# Patient Record
Sex: FEMALE | URBAN - NONMETROPOLITAN AREA
[De-identification: names, ages, dates, MRNs, and addresses within clinical notes are randomized per-mention and may not be internally consistent; named-entity substitution may affect disease eponyms.]

---

## 2024-04-17 ENCOUNTER — TELEPHONE (OUTPATIENT)
Dept: SURGERY | Age: 54
End: 2024-04-17

## 2024-05-06 ENCOUNTER — HOSPITAL ENCOUNTER (OUTPATIENT)
Dept: ULTRASOUND IMAGING | Age: 54
Discharge: HOME | End: 2024-05-06
Attending: RADIOLOGY

## 2024-05-06 DIAGNOSIS — Z00.6 ENCOUNTER FOR EXAMINATION FOR NORMAL COMPARISON OR CONTROL IN CLINICAL RESEARCH PROGRAM: ICD-10-CM

## 2024-05-08 ENCOUNTER — OFFICE VISIT (OUTPATIENT)
Dept: SURGERY | Age: 54
End: 2024-05-08
Payer: MEDICAID

## 2024-05-08 VITALS
HEART RATE: 92 BPM | DIASTOLIC BLOOD PRESSURE: 68 MMHG | OXYGEN SATURATION: 98 % | RESPIRATION RATE: 16 BRPM | SYSTOLIC BLOOD PRESSURE: 132 MMHG | BODY MASS INDEX: 39.05 KG/M2 | TEMPERATURE: 97.8 F | HEIGHT: 63 IN | WEIGHT: 220.4 LBS

## 2024-05-08 DIAGNOSIS — Z01.818 PRE-OP TESTING: ICD-10-CM

## 2024-05-08 DIAGNOSIS — K80.20 CALCULUS OF GALLBLADDER WITHOUT CHOLECYSTITIS WITHOUT OBSTRUCTION: Primary | ICD-10-CM

## 2024-05-08 LAB
ALBUMIN: 4.4 G/DL (ref 3.5–5)
ALP BLD-CCNC: 94 IU/L (ref 39–118)
ALT SERPL-CCNC: 18 IU/L (ref 10–40)
AST SERPL-CCNC: 17 IU/L (ref 15–41)
BILIRUB SERPL-MCNC: 0.3 MG/DL (ref 0.2–1)
BILIRUBIN DIRECT: 0.1 MG/DL (ref 0.1–0.2)
TOTAL PROTEIN: 6.9 G/DL (ref 6.2–8)

## 2024-05-08 PROCEDURE — 99204 OFFICE O/P NEW MOD 45 MIN: CPT | Performed by: SURGERY

## 2024-05-09 ASSESSMENT — ENCOUNTER SYMPTOMS
EYE ITCHING: 0
CONSTIPATION: 0
DIARRHEA: 1
VOICE CHANGE: 0
NAUSEA: 1
SHORTNESS OF BREATH: 0
EYE REDNESS: 0
RHINORRHEA: 0
EYE DISCHARGE: 0
APNEA: 0
RECTAL PAIN: 0
BLOOD IN STOOL: 0
EYE PAIN: 0
SINUS PRESSURE: 0
ABDOMINAL DISTENTION: 1
WHEEZING: 0
COLOR CHANGE: 0
TROUBLE SWALLOWING: 0
ALLERGIC/IMMUNOLOGIC NEGATIVE: 1
ABDOMINAL PAIN: 1
PHOTOPHOBIA: 0
CHEST TIGHTNESS: 0
FACIAL SWELLING: 0
SORE THROAT: 0
COUGH: 0
BACK PAIN: 0
VOMITING: 1
CHOKING: 0
ANAL BLEEDING: 0
STRIDOR: 0

## 2024-05-17 ENCOUNTER — PREP FOR PROCEDURE (OUTPATIENT)
Dept: SURGERY | Age: 54
End: 2024-05-17

## 2024-05-17 RX ORDER — SODIUM CHLORIDE 0.9 % (FLUSH) 0.9 %
5-40 SYRINGE (ML) INJECTION PRN
Status: CANCELLED | OUTPATIENT
Start: 2024-05-17

## 2024-05-17 RX ORDER — SODIUM CHLORIDE 9 MG/ML
INJECTION, SOLUTION INTRAVENOUS CONTINUOUS
Status: CANCELLED | OUTPATIENT
Start: 2024-05-17

## 2024-05-17 RX ORDER — SODIUM CHLORIDE 0.9 % (FLUSH) 0.9 %
5-40 SYRINGE (ML) INJECTION EVERY 12 HOURS SCHEDULED
Status: CANCELLED | OUTPATIENT
Start: 2024-05-17

## 2024-05-17 RX ORDER — INDOCYANINE GREEN AND WATER 25 MG
2.5 KIT INJECTION ONCE
Status: CANCELLED | OUTPATIENT
Start: 2024-05-17 | End: 2024-05-17

## 2024-05-17 RX ORDER — SODIUM CHLORIDE 9 MG/ML
INJECTION, SOLUTION INTRAVENOUS PRN
Status: CANCELLED | OUTPATIENT
Start: 2024-05-17

## 2024-05-26 NOTE — H&P
Barbara Olivares (:  1970)      ASSESSMENT:  1.  Biliary dyskinesia  2.  Cholelithiasis  3.  Obesity (BMI 39)     PLAN:  1. Schedule Barbara for robotic possible open cholecystectomy.  2. She will undergo pre-operative clearance per anesthesia guidelines with risk factors listed under the past medical history diagnosis & problem list.  3. The risks, benefits and alternatives were discussed with Barbara including non-operative management.  The pros and cons of robotic, laparoscopic and open techniques were discussed.  All questions answered. She understands and wishes to proceed with surgical intervention.  4. Restrictions discussed with Barbara and she expresses understanding.  5. She is advised to call back directly if there are further questions/concerns, or if her symptoms worsen prior to surgery.  6.  Dietary modification/restrictions discussed with patient in regards to biliary disease     SUBJECTIVE/OBJECTIVE:          Chief Complaint   Patient presents with    Surgical Consult       NP ref by Ashly Hernandez CNP - Cholelithiasis      HPI  Barbara is a 53-year-old female who presents for initial evaluation secondary to gallbladder disease.  She states she has been having attacks now for about 3 months.  Epigastric and right upper quadrant abdominal pain.  Worse after eating.  Especially worse after large heavy fatty meals.  Associated with some nausea.  Rare emesis.  Dark tea colored urine.  Light stools.  Pain can become severe.  Sharp.  Attacks last several minutes to several hours.  They seem to be coming more frequent and are more severe.  No generalized abdominal pain.  No chest pain or shortness of breath.  No hematochezia or melena.  History of .  Gallbladder ultrasound demonstrated significant cholelithiasis.  HIDA scan had ejection fraction of only 14%.     Review of Systems   Constitutional:  Positive for appetite change. Negative for activity change, chills, diaphoresis, fatigue, fever

## 2024-05-28 ENCOUNTER — HOSPITAL ENCOUNTER (OUTPATIENT)
Age: 54
Setting detail: OUTPATIENT SURGERY
Discharge: HOME | End: 2024-05-28
Attending: SURGERY | Admitting: SURGERY
Payer: MEDICAID

## 2024-05-28 ENCOUNTER — ANESTHESIA EVENT (OUTPATIENT)
Dept: OPERATING ROOM | Age: 54
End: 2024-05-28
Payer: MEDICAID

## 2024-05-28 ENCOUNTER — ANESTHESIA (OUTPATIENT)
Dept: OPERATING ROOM | Age: 54
End: 2024-05-28
Payer: MEDICAID

## 2024-05-28 VITALS
HEART RATE: 99 BPM | RESPIRATION RATE: 18 BRPM | WEIGHT: 216.4 LBS | OXYGEN SATURATION: 99 % | DIASTOLIC BLOOD PRESSURE: 68 MMHG | TEMPERATURE: 97 F | HEIGHT: 63 IN | SYSTOLIC BLOOD PRESSURE: 129 MMHG | BODY MASS INDEX: 38.34 KG/M2

## 2024-05-28 DIAGNOSIS — K80.10 CALCULUS OF GALLBLADDER WITH CHOLECYSTITIS WITHOUT BILIARY OBSTRUCTION, UNSPECIFIED CHOLECYSTITIS ACUITY: ICD-10-CM

## 2024-05-28 PROCEDURE — 3700000000 HC ANESTHESIA ATTENDED CARE: Performed by: SURGERY

## 2024-05-28 PROCEDURE — C1889 IMPLANT/INSERT DEVICE, NOC: HCPCS | Performed by: SURGERY

## 2024-05-28 PROCEDURE — 3600000019 HC SURGERY ROBOT ADDTL 15MIN: Performed by: SURGERY

## 2024-05-28 PROCEDURE — 2580000003 HC RX 258: Performed by: NURSE PRACTITIONER

## 2024-05-28 PROCEDURE — 3600000009 HC SURGERY ROBOT BASE: Performed by: SURGERY

## 2024-05-28 PROCEDURE — 6370000000 HC RX 637 (ALT 250 FOR IP)

## 2024-05-28 PROCEDURE — 2500000003 HC RX 250 WO HCPCS: Performed by: NURSE ANESTHETIST, CERTIFIED REGISTERED

## 2024-05-28 PROCEDURE — 7100000011 HC PHASE II RECOVERY - ADDTL 15 MIN: Performed by: SURGERY

## 2024-05-28 PROCEDURE — 2500000003 HC RX 250 WO HCPCS: Performed by: NURSE PRACTITIONER

## 2024-05-28 PROCEDURE — 47562 LAPAROSCOPIC CHOLECYSTECTOMY: CPT | Performed by: SURGERY

## 2024-05-28 PROCEDURE — 2709999900 HC NON-CHARGEABLE SUPPLY: Performed by: SURGERY

## 2024-05-28 PROCEDURE — 6360000002 HC RX W HCPCS: Performed by: SURGERY

## 2024-05-28 PROCEDURE — 6360000002 HC RX W HCPCS: Performed by: NURSE PRACTITIONER

## 2024-05-28 PROCEDURE — 7100000000 HC PACU RECOVERY - FIRST 15 MIN: Performed by: SURGERY

## 2024-05-28 PROCEDURE — 6370000000 HC RX 637 (ALT 250 FOR IP): Performed by: SURGERY

## 2024-05-28 PROCEDURE — 6360000002 HC RX W HCPCS: Performed by: NURSE ANESTHETIST, CERTIFIED REGISTERED

## 2024-05-28 PROCEDURE — 7100000010 HC PHASE II RECOVERY - FIRST 15 MIN: Performed by: SURGERY

## 2024-05-28 PROCEDURE — S2900 ROBOTIC SURGICAL SYSTEM: HCPCS | Performed by: SURGERY

## 2024-05-28 PROCEDURE — 3700000001 HC ADD 15 MINUTES (ANESTHESIA): Performed by: SURGERY

## 2024-05-28 PROCEDURE — 7100000001 HC PACU RECOVERY - ADDTL 15 MIN: Performed by: SURGERY

## 2024-05-28 PROCEDURE — 88304 TISSUE EXAM BY PATHOLOGIST: CPT

## 2024-05-28 DEVICE — CLIP INT L POLYMER LOK LIG HEM O LOK (6EA/PK): Type: IMPLANTABLE DEVICE | Status: FUNCTIONAL

## 2024-05-28 RX ORDER — FENTANYL CITRATE 50 UG/ML
INJECTION, SOLUTION INTRAMUSCULAR; INTRAVENOUS PRN
Status: DISCONTINUED | OUTPATIENT
Start: 2024-05-28 | End: 2024-05-28 | Stop reason: SDUPTHER

## 2024-05-28 RX ORDER — ROCURONIUM BROMIDE 10 MG/ML
INJECTION, SOLUTION INTRAVENOUS PRN
Status: DISCONTINUED | OUTPATIENT
Start: 2024-05-28 | End: 2024-05-28 | Stop reason: SDUPTHER

## 2024-05-28 RX ORDER — MORPHINE SULFATE 4 MG/ML
4 INJECTION, SOLUTION INTRAMUSCULAR; INTRAVENOUS
Status: DISCONTINUED | OUTPATIENT
Start: 2024-05-28 | End: 2024-05-28 | Stop reason: HOSPADM

## 2024-05-28 RX ORDER — OXYCODONE HYDROCHLORIDE 5 MG/1
5 TABLET ORAL EVERY 4 HOURS PRN
Status: DISCONTINUED | OUTPATIENT
Start: 2024-05-28 | End: 2024-05-28 | Stop reason: HOSPADM

## 2024-05-28 RX ORDER — FAMOTIDINE 20 MG/1
20 TABLET, FILM COATED ORAL 2 TIMES DAILY
Status: CANCELLED | OUTPATIENT
Start: 2024-05-28

## 2024-05-28 RX ORDER — SODIUM CHLORIDE 0.9 % (FLUSH) 0.9 %
5-40 SYRINGE (ML) INJECTION EVERY 12 HOURS SCHEDULED
Status: DISCONTINUED | OUTPATIENT
Start: 2024-05-28 | End: 2024-05-28 | Stop reason: HOSPADM

## 2024-05-28 RX ORDER — HYDROCODONE BITARTRATE AND ACETAMINOPHEN 5; 325 MG/1; MG/1
1-2 TABLET ORAL EVERY 6 HOURS PRN
Qty: 20 TABLET | Refills: 0 | Status: SHIPPED | OUTPATIENT
Start: 2024-05-28 | End: 2024-06-03

## 2024-05-28 RX ORDER — FENTANYL CITRATE 50 UG/ML
50 INJECTION, SOLUTION INTRAMUSCULAR; INTRAVENOUS EVERY 5 MIN PRN
Status: DISCONTINUED | OUTPATIENT
Start: 2024-05-28 | End: 2024-05-28 | Stop reason: HOSPADM

## 2024-05-28 RX ORDER — SODIUM CHLORIDE 0.9 % (FLUSH) 0.9 %
5-40 SYRINGE (ML) INJECTION PRN
Status: DISCONTINUED | OUTPATIENT
Start: 2024-05-28 | End: 2024-05-28 | Stop reason: HOSPADM

## 2024-05-28 RX ORDER — SODIUM CHLORIDE 9 MG/ML
INJECTION, SOLUTION INTRAVENOUS PRN
Status: DISCONTINUED | OUTPATIENT
Start: 2024-05-28 | End: 2024-05-28 | Stop reason: HOSPADM

## 2024-05-28 RX ORDER — KETOROLAC TROMETHAMINE 30 MG/ML
INJECTION, SOLUTION INTRAMUSCULAR; INTRAVENOUS PRN
Status: DISCONTINUED | OUTPATIENT
Start: 2024-05-28 | End: 2024-05-28 | Stop reason: SDUPTHER

## 2024-05-28 RX ORDER — MORPHINE SULFATE 2 MG/ML
2 INJECTION, SOLUTION INTRAMUSCULAR; INTRAVENOUS
Status: DISCONTINUED | OUTPATIENT
Start: 2024-05-28 | End: 2024-05-28 | Stop reason: HOSPADM

## 2024-05-28 RX ORDER — INDOCYANINE GREEN AND WATER 25 MG
2.5 KIT INJECTION ONCE
Status: COMPLETED | OUTPATIENT
Start: 2024-05-28 | End: 2024-05-28

## 2024-05-28 RX ORDER — SODIUM CHLORIDE 9 MG/ML
INJECTION, SOLUTION INTRAVENOUS PRN
Status: CANCELLED | OUTPATIENT
Start: 2024-05-28

## 2024-05-28 RX ORDER — OXYCODONE HYDROCHLORIDE 5 MG/1
10 TABLET ORAL EVERY 4 HOURS PRN
Status: DISCONTINUED | OUTPATIENT
Start: 2024-05-28 | End: 2024-05-28 | Stop reason: HOSPADM

## 2024-05-28 RX ORDER — SODIUM CHLORIDE 0.9 % (FLUSH) 0.9 %
5-40 SYRINGE (ML) INJECTION PRN
Status: CANCELLED | OUTPATIENT
Start: 2024-05-28

## 2024-05-28 RX ORDER — ONDANSETRON 4 MG/1
4 TABLET, ORALLY DISINTEGRATING ORAL EVERY 8 HOURS PRN
Status: CANCELLED | OUTPATIENT
Start: 2024-05-28

## 2024-05-28 RX ORDER — BUPIVACAINE HYDROCHLORIDE 5 MG/ML
INJECTION, SOLUTION EPIDURAL; INTRACAUDAL PRN
Status: DISCONTINUED | OUTPATIENT
Start: 2024-05-28 | End: 2024-05-28 | Stop reason: ALTCHOICE

## 2024-05-28 RX ORDER — MEPERIDINE HYDROCHLORIDE 25 MG/ML
12.5 INJECTION INTRAMUSCULAR; INTRAVENOUS; SUBCUTANEOUS EVERY 5 MIN PRN
Status: DISCONTINUED | OUTPATIENT
Start: 2024-05-28 | End: 2024-05-28 | Stop reason: HOSPADM

## 2024-05-28 RX ORDER — ONDANSETRON 2 MG/ML
4 INJECTION INTRAMUSCULAR; INTRAVENOUS EVERY 6 HOURS PRN
Status: CANCELLED | OUTPATIENT
Start: 2024-05-28

## 2024-05-28 RX ORDER — MIDAZOLAM HYDROCHLORIDE 1 MG/ML
INJECTION INTRAMUSCULAR; INTRAVENOUS PRN
Status: DISCONTINUED | OUTPATIENT
Start: 2024-05-28 | End: 2024-05-28 | Stop reason: SDUPTHER

## 2024-05-28 RX ORDER — SODIUM CHLORIDE 9 MG/ML
INJECTION, SOLUTION INTRAVENOUS CONTINUOUS
Status: DISCONTINUED | OUTPATIENT
Start: 2024-05-28 | End: 2024-05-28 | Stop reason: HOSPADM

## 2024-05-28 RX ORDER — SODIUM CHLORIDE 0.9 % (FLUSH) 0.9 %
5-40 SYRINGE (ML) INJECTION EVERY 12 HOURS SCHEDULED
Status: CANCELLED | OUTPATIENT
Start: 2024-05-28

## 2024-05-28 RX ORDER — IPRATROPIUM BROMIDE AND ALBUTEROL SULFATE 2.5; .5 MG/3ML; MG/3ML
1 SOLUTION RESPIRATORY (INHALATION) ONCE
Status: COMPLETED | OUTPATIENT
Start: 2024-05-28 | End: 2024-05-28

## 2024-05-28 RX ORDER — IPRATROPIUM BROMIDE AND ALBUTEROL SULFATE 2.5; .5 MG/3ML; MG/3ML
SOLUTION RESPIRATORY (INHALATION)
Status: COMPLETED
Start: 2024-05-28 | End: 2024-05-28

## 2024-05-28 RX ORDER — FENTANYL CITRATE 50 UG/ML
25 INJECTION, SOLUTION INTRAMUSCULAR; INTRAVENOUS EVERY 5 MIN PRN
Status: DISCONTINUED | OUTPATIENT
Start: 2024-05-28 | End: 2024-05-28 | Stop reason: HOSPADM

## 2024-05-28 RX ORDER — KETOROLAC TROMETHAMINE 10 MG/1
10 TABLET, FILM COATED ORAL EVERY 8 HOURS PRN
Qty: 15 TABLET | Refills: 0 | Status: SHIPPED | OUTPATIENT
Start: 2024-05-28

## 2024-05-28 RX ORDER — ACETAMINOPHEN 325 MG/1
650 TABLET ORAL EVERY 4 HOURS PRN
Status: DISCONTINUED | OUTPATIENT
Start: 2024-05-28 | End: 2024-05-28 | Stop reason: HOSPADM

## 2024-05-28 RX ORDER — ONDANSETRON 2 MG/ML
4 INJECTION INTRAMUSCULAR; INTRAVENOUS
Status: DISCONTINUED | OUTPATIENT
Start: 2024-05-28 | End: 2024-05-28 | Stop reason: HOSPADM

## 2024-05-28 RX ORDER — NALOXONE HYDROCHLORIDE 0.4 MG/ML
INJECTION, SOLUTION INTRAMUSCULAR; INTRAVENOUS; SUBCUTANEOUS PRN
Status: DISCONTINUED | OUTPATIENT
Start: 2024-05-28 | End: 2024-05-28 | Stop reason: HOSPADM

## 2024-05-28 RX ADMIN — MIDAZOLAM 4 MG: 1 INJECTION INTRAMUSCULAR; INTRAVENOUS at 13:18

## 2024-05-28 RX ADMIN — ACETAMINOPHEN 650 MG: 325 TABLET ORAL at 15:08

## 2024-05-28 RX ADMIN — OXYCODONE HYDROCHLORIDE 5 MG: 5 TABLET ORAL at 15:09

## 2024-05-28 RX ADMIN — IPRATROPIUM BROMIDE AND ALBUTEROL SULFATE 1 DOSE: .5; 3 SOLUTION RESPIRATORY (INHALATION) at 14:35

## 2024-05-28 RX ADMIN — KETOROLAC TROMETHAMINE 30 MG: 30 INJECTION, SOLUTION INTRAMUSCULAR; INTRAVENOUS at 13:58

## 2024-05-28 RX ADMIN — FENTANYL CITRATE 100 MCG: 50 INJECTION, SOLUTION INTRAMUSCULAR; INTRAVENOUS at 13:24

## 2024-05-28 RX ADMIN — INDOCYANINE GREEN 2.5 MG: 25 INJECTION, POWDER, LYOPHILIZED, FOR SOLUTION INTRAVENOUS at 12:31

## 2024-05-28 RX ADMIN — ROCURONIUM BROMIDE 50 MG: 10 INJECTION INTRAVENOUS at 13:24

## 2024-05-28 RX ADMIN — WATER 2000 MG: 1 INJECTION INTRAMUSCULAR; INTRAVENOUS; SUBCUTANEOUS at 13:29

## 2024-05-28 RX ADMIN — SUGAMMADEX 200 MG: 100 INJECTION, SOLUTION INTRAVENOUS at 14:02

## 2024-05-28 RX ADMIN — IPRATROPIUM BROMIDE AND ALBUTEROL SULFATE 1 DOSE: 2.5; .5 SOLUTION RESPIRATORY (INHALATION) at 14:35

## 2024-05-28 RX ADMIN — SODIUM CHLORIDE: 9 INJECTION, SOLUTION INTRAVENOUS at 11:32

## 2024-05-28 ASSESSMENT — PAIN - FUNCTIONAL ASSESSMENT
PAIN_FUNCTIONAL_ASSESSMENT: 0-10
PAIN_FUNCTIONAL_ASSESSMENT: ACTIVITIES ARE NOT PREVENTED
PAIN_FUNCTIONAL_ASSESSMENT: NONE - DENIES PAIN

## 2024-05-28 ASSESSMENT — PAIN DESCRIPTION - LOCATION
LOCATION: ABDOMEN
LOCATION: ABDOMEN;HEAD

## 2024-05-28 ASSESSMENT — PAIN SCALES - GENERAL
PAINLEVEL_OUTOF10: 3
PAINLEVEL_OUTOF10: 5

## 2024-05-28 ASSESSMENT — PAIN DESCRIPTION - ORIENTATION
ORIENTATION: INNER;MID
ORIENTATION: INNER

## 2024-05-28 ASSESSMENT — PAIN DESCRIPTION - DESCRIPTORS
DESCRIPTORS: ACHING
DESCRIPTORS: ACHING

## 2024-05-28 ASSESSMENT — PAIN DESCRIPTION - PAIN TYPE: TYPE: SURGICAL PAIN

## 2024-05-28 NOTE — PROGRESS NOTES
Pt returned to [facility] room 1. Vitals and assessment as charted. 0.9 infusing, @350ml to count from PACU. Pt has crackers and water. Family at the bedside. Pt and family verbalized understanding of discharge criteria and call light use. Call light in reach.

## 2024-05-28 NOTE — DISCHARGE INSTRUCTIONS
DR. MENDOZA'S DISCHARGE INSTRUCTIONS    Pt Name: Barbara Olivares  Medical Record Number: [id number]  Today's Date: 5/28/2024    GENERAL ANESTHESIA OR SEDATION  1. Do not drive or operate hazardous machinery for 24 hours.  2. Do not make important business or personal decisions for 24 hours.  3. Do not drink alcoholic beverages or use tobacco for 24 hours.    ACTIVITY INSTRUCTIONS:  [] Rest today. Resume light to normal activity tomorrow.   [x] You may resume normal activity tomorrow. Do not engage in strenuous activity that may place stress on your incision.  [x] Do not drive for 3-5 days and avoid heavy lifting, tugging, pullings greater than 10-20 lbs until seen in the office.      DIET INSTRUCTIONS:  [x]Begin with clear liquids. If not nauseated, may increase to a low-fat diet when you desire. Greasy and spicy foods are not advised.  [x]Regular diet as tolerated.  []Other:     MEDICATIONS  [x]Prescription sent with you to be used as directed.   []Valium   [x]Norco   []Percocet   [x]Toradol   []Oxycontin     Do not drink alcohol or drive while taking these medications. You may experience dizziness or drowsiness with these medications. You may also experience constipation which can be relieved with stool softners or laxatives.    - Take Colace 100 mg 1-2 tabs daily as needed for constipation  - Take MiraLax 1-2 cap fulls daily as needed for constipation    [x]You may resume your daily prescription medication schedule unless otherwise specified.  []Do not take 325mg Aspirin or other blood thinners such as Coumadin or Plavix for 5 days.    **Pain medication at discharge - use only as prescribed- refills may be available to you at your follow up appointments if needed and warranted.  Narcotics should be used for only short term and we highly encouraged our patients to wean off appropriately and use other means for pain such as non pharmacologic measures and over the counter tylenol or ibuprofen if no restrictions

## 2024-05-28 NOTE — INTERVAL H&P NOTE
Update History & Physical    The patient's History and Physical was reviewed with the patient and I examined the patient. There was no change. The surgical site was confirmed by the patient and me.     Plan: The risks, benefits, expected outcome, and alternative to the recommended procedure have been discussed with the patient. Patient understands and wants to proceed with the procedure.     Electronically signed by Mc Green MD on 5/28/2024 at 12:22 PM

## 2024-05-28 NOTE — ANESTHESIA POSTPROCEDURE EVALUATION
Department of Anesthesiology  Postprocedure Note    Patient: Barbara Olivares  MRN: [id number]  YOB: 1970  Date of evaluation: 5/28/2024    Procedure Summary       Date: 05/28/24 Room / Location: [facility] OR  / [facility] OR    Anesthesia Start: 1318 Anesthesia Stop: 1426    Procedure: Robot Cholecystectomy Diagnosis:       Calculus of gallbladder with cholecystitis without biliary obstruction, unspecified cholecystitis acuity      (Calculus of gallbladder with cholecystitis without biliary obstruction, unspecified cholecystitis acuity [K80.10])    Surgeons: Mc Green MD Responsible Provider: Demarco Alejandro DO    Anesthesia Type: general ASA Status: 3            Anesthesia Type: No value filed.    Janet Phase I: Janet Score: 9    Janet Phase II:      Anesthesia Post Evaluation    Patient location during evaluation: PACU  Patient participation: complete - patient participated  Level of consciousness: awake and alert  Pain score: 4  Airway patency: patent  Nausea & Vomiting: no nausea and no vomiting  Cardiovascular status: hemodynamically stable and blood pressure returned to baseline  Respiratory status: spontaneous ventilation, room air and acceptable  Hydration status: stable  Pain management: adequate and satisfactory to patient    No notable events documented.

## 2024-05-28 NOTE — ANESTHESIA PRE PROCEDURE
Department of Anesthesiology  Preprocedure Note       Name:  Barbara Olivares   Age:  53 y.o.  :  1970                                          MRN:  [id number]         Date:  2024      Surgeon: Surgeon(s):  Mc Green MD    Procedure: Procedure(s):  Robot Cholecystectomy Poss Open    Medications prior to admission:   Prior to Admission medications    Medication Sig Start Date End Date Taking? Authorizing Provider   Multiple Vitamin (MULTIVITAMIN ADULT PO) Take by mouth    Provider, MD Jerad       Current medications:    Current Facility-Administered Medications   Medication Dose Route Frequency Provider Last Rate Last Admin   • sodium chloride flush 0.9 % injection 5-40 mL  5-40 mL IntraVENous 2 times per day Rivas Cruz APRN - CNP       • sodium chloride flush 0.9 % injection 5-40 mL  5-40 mL IntraVENous PRN Rivas Cruz APRN - CNP       • 0.9 % sodium chloride infusion   IntraVENous PRN Rivas Cruz APRN - CNP       • 0.9 % sodium chloride infusion   IntraVENous Continuous Rivas Cruz APRN - CNP 20 mL/hr at 24 1132 New Bag at 24 1132   • ceFAZolin (ANCEF) 2,000 mg in sterile water 20 mL IV syringe  2,000 mg IntraVENous On Call to OR Rivas Curz APRN - CNP           Allergies:  No Known Allergies    Problem List:  There is no problem list on file for this patient.      Past Medical History:  History reviewed. No pertinent past medical history.    Past Surgical History:        Procedure Laterality Date   • ANKLE FRACTURE SURGERY Right 2009   •  SECTION     • OVARIAN CYST SURGERY         Social History:    Social History     Tobacco Use   • Smoking status: Never   • Smokeless tobacco: Never   Substance Use Topics   • Alcohol use: Yes     Comment: occassional                                Counseling given: Not Answered      Vital Signs (Current):   Vitals:    24 1100   BP: (!) 145/81   Pulse: 80   Resp: 18   Temp: 97.5 °F (36.4

## 2024-05-28 NOTE — PROGRESS NOTES
1419: Pt arrives to pacu, alert. Persistent cough noted, suctioned upon arrival. Pt on NC, respirations easy and unlabored. VSS  1425: Ice chips provided, tolerated well  1435: Duoneb administered at this time  1445: Some improvement in cough noted. Pt continues to deny pain. VSS  1455: Pt meets criteria for discharge from pacu, transported back to \[facility]\"" in stable condition. VSS

## 2024-05-28 NOTE — BRIEF OP NOTE
Brief Postoperative Note      Patient: Barbara Olivares  YOB: 1970  MRN: [id number]    Date of Procedure: 5/28/2024    Pre-Op Diagnosis Codes:     * Calculus of gallbladder with cholecystitis without biliary obstruction, unspecified cholecystitis acuity [K80.10]    Post-Op Diagnosis: Same       Procedure(s):  Robot Cholecystectomy    Surgeon(s):  Mc Green MD    Assistant:  First Assistant: Joseph Herrera RN    Anesthesia: General/local    Estimated Blood Loss (mL): 10ml    Complications: None    Specimens:   ID Type Source Tests Collected by Time Destination   A : gallbladder Tissue Gallbladder SURGICAL PATHOLOGY Mc Green MD 5/28/2024 1251        Implants:  Implant Name Type Inv. Item Serial No.  Lot No. LRB No. Used Action   CLIP INT L POLYMER CHICHI LIG HEM O CHICHI (6EA/PK) - [id number]  CLIP INT L POLYMER CHICHI LIG HEM O CHICHI (6EA/PK)  [employer]-  N/A 1 Implanted         Drains: * No LDAs found *    Findings:  Infection Present At Time Of Surgery (PATOS) (choose all levels that have infection present):  No infection present    Other Findings: see op note    Electronically signed by Mc Green MD on 5/28/2024 at 2:03 PM

## 2024-05-28 NOTE — OP NOTE
[address] [address]                            OPERATIVE REPORT      PATIENT NAME: BRITTANIE JUNIOR                : 1970  MED REC NO: [id number]                       ROOM: [facility]                                               (General) POOL                                               RM    ACCOUNT NO: [id number]                       ADMIT DATE: 2024  PROVIDER: Mc Green MD      DATE OF PROCEDURE:  2024    SURGEON:  Mc Green MD    PREOPERATIVE DIAGNOSES:    1. Cholelithiasis.  2. Biliary dyskinesia.  3. Obesity (body mass index of 38).    POSTOPERATIVE DIAGNOSES:    1. Cholelithiasis.  2. Biliary dyskinesia.  3. Obesity (body mass index of 38).    PROCEDURE:  Robotic laparoscopic cholecystectomy.    ASSISTANT:  Abraham Herrera.    ANESTHESIA:  General/local.    ESTIMATED BLOOD LOSS:  10 mL.    DRAINS:  None.    COMPLICATIONS:  None.    DISPOSITION:  Stable to the recovery room.    INDICATIONS:  The patient is a 53-year-old female, whom I had seen in the office secondary to gallbladder disease.  Both operative and nonoperative intervention plans were discussed.  Risks of surgery were further discussed.  Some of the risks included, but were not limited to bleeding, infection, the need for reoperation, severe chronic postoperative pain or numbness, major vascular or nerve injury, cardiopulmonary complications, anesthetic complications, seroma or hematoma formation, wound breakdown, trocar site herniation, bile leak, bile duct injury, biloma formation, chronic pain, and death.  After all of the questions were answered in their entirety and the patient was completely aware of the current situation, she elected to proceed with the procedure.    DESCRIPTION OF THE PROCEDURE:  After informed consent was signed and placed on the chart, the patient was taken back to the operating room and placed supine on the

## 2024-05-28 NOTE — PROGRESS NOTES

## 2024-05-28 NOTE — INTERVAL H&P NOTE
Update History & Physical    The patient's History and Physical was reviewed with the patient and I examined the patient. There was no change. The surgical site was confirmed by the patient and me.     Plan: The risks, benefits, expected outcome, and alternative to the recommended procedure have been discussed with the patient. Patient understands and wants to proceed with the procedure.     Electronically signed by Mc Green MD on 5/28/2024 at 12:21 PM

## 2024-05-28 NOTE — PROGRESS NOTES
Pt admitted to [facility] room 1 and oriented to unit. SCD sleeves applied. Nares swabbed. Pt verbalized permission for first name, last initial and physicians name on white board. SDS board and discharge criteria explained, pt and family verbalized understanding. Pt denies thoughts of harming self or others. Call light in reach. Family at the bedside.

## 2024-05-29 ENCOUNTER — TELEPHONE (OUTPATIENT)
Dept: SURGERY | Age: 54
End: 2024-05-29

## 2024-05-29 NOTE — TELEPHONE ENCOUNTER
I called pt in regards to s/p robotic laparoscopic cholecystectomy 5/28/24.  Pt states she is a little sore, but otherwise doing well.  She will call if she has any issues.

## 2024-06-14 DIAGNOSIS — K80.20 CALCULUS OF GALLBLADDER WITHOUT CHOLECYSTITIS WITHOUT OBSTRUCTION: ICD-10-CM

## 2024-06-19 ENCOUNTER — OFFICE VISIT (OUTPATIENT)
Dept: SURGERY | Age: 54
End: 2024-06-19

## 2024-06-19 VITALS
DIASTOLIC BLOOD PRESSURE: 72 MMHG | SYSTOLIC BLOOD PRESSURE: 114 MMHG | BODY MASS INDEX: 38.46 KG/M2 | HEART RATE: 75 BPM | TEMPERATURE: 96.8 F | HEIGHT: 63 IN | OXYGEN SATURATION: 98 % | WEIGHT: 217.1 LBS

## 2024-06-19 DIAGNOSIS — Z90.49 S/P LAPAROSCOPIC CHOLECYSTECTOMY: Primary | ICD-10-CM

## 2024-06-19 PROCEDURE — 99024 POSTOP FOLLOW-UP VISIT: CPT | Performed by: NURSE PRACTITIONER

## 2024-06-19 ASSESSMENT — ENCOUNTER SYMPTOMS
COLOR CHANGE: 0
SINUS PRESSURE: 0
CHOKING: 0
ANAL BLEEDING: 0
APNEA: 0
COUGH: 0
VOMITING: 0
BACK PAIN: 0
BLOOD IN STOOL: 0
RHINORRHEA: 0
EYE DISCHARGE: 0
PHOTOPHOBIA: 0
ABDOMINAL DISTENTION: 0
WHEEZING: 0
ABDOMINAL PAIN: 0
CONSTIPATION: 0
DIARRHEA: 0
SORE THROAT: 0
EYE PAIN: 0
ALLERGIC/IMMUNOLOGIC NEGATIVE: 1
EYE ITCHING: 0
CHEST TIGHTNESS: 0
NAUSEA: 0
SHORTNESS OF BREATH: 0

## 2024-06-19 NOTE — PROGRESS NOTES
[facility] [place] [facility]  [facility] GENERAL SURGERY  [address]  [place] [address]  Dept: [phone]  Dept Fax: [phone]  Loc: [phone]    Visit Date: 2024    Barbara Olivares is a 53 y.o. female who presents today for:  Chief Complaint   Patient presents with    Post-Op Check     S/P Robotic laparoscopic cholecystectomy 24       HPI:     HPI    Barbara is a 53-year-old female patient who presents today for follow-up status post laparoscopic cholecystectomy 3 weeks ago Dr. Green.  Patient is doing well. Has no complaints.  Off narcotics.  Denies incisional soreness.  Tolerating regular diet without any nausea or vomiting.  Denies any issues with constipation or diarrhea.  Incisions are healing well without any signs of infection.  No fevers or chills.  Urinating without difficulties.  Denies any shortness of breath or chest pain.  Tolerating increased activity well.    History reviewed. No pertinent past medical history.   Past Surgical History:   Procedure Laterality Date    ANKLE FRACTURE SURGERY Right 2009     SECTION      CHOLECYSTECTOMY, LAPAROSCOPIC N/A 2024    Robot Cholecystectomy performed by Mc Green MD at [facility] OR    OVARIAN CYST SURGERY         History reviewed. No pertinent family history.    Social History     Tobacco Use    Smoking status: Never    Smokeless tobacco: Never   Substance Use Topics    Alcohol use: Yes     Comment: occassional      Current Outpatient Medications   Medication Sig Dispense Refill    Multiple Vitamin (MULTIVITAMIN ADULT PO) Take by mouth       No current facility-administered medications for this visit.     No Known Allergies    Subjective:     Review of Systems   Constitutional:  Negative for activity change, appetite change, chills, diaphoresis, fatigue, fever and unexpected weight change.   HENT:  Negative for congestion, dental problem, hearing loss, rhinorrhea, sinus pressure and

## 2025-07-14 ENCOUNTER — HOSPITAL ENCOUNTER (OUTPATIENT)
Dept: PHYSICAL THERAPY | Age: 55
Setting detail: THERAPIES SERIES
Discharge: HOME | End: 2025-07-14
Payer: MEDICAID

## 2025-07-14 PROCEDURE — 97530 THERAPEUTIC ACTIVITIES: CPT

## 2025-07-14 PROCEDURE — 97162 PT EVAL MOD COMPLEX 30 MIN: CPT

## 2025-07-14 NOTE — PROGRESS NOTES
[facility]  PHYSICAL THERAPY  [x] EVALUATION  [] DAILY NOTE (LAND) [] DAILY NOTE (AQUATIC ) [] PROGRESS NOTE [] DISCHARGE NOTE    [] OUTPATIENT REHABILITATION CENTER [facility]   [] [place] [facility]    [] [facility]   [x] [facility]    Date: 2025  Patient Name:  Barbara Olivares  : 1970  MRN: [id number]  CSN: [id number]    Referring Practitioner Mook Chambers MD [id number]      Diagnosis  Diagnoses       M17.11 (ICD-10-CM) - Unilateral primary osteoarthritis, right knee    Z01.810 (ICD-10-CM) - Encounter for preprocedural cardiovascular examination    Z01.811 (ICD-10-CM) - Encounter for preprocedural respiratory examination    Z01.812 (ICD-10-CM) - Encounter for preprocedural laboratory examination           Treatment Diagnosis Z47.1 Aftercare following joint replacement surgery  M25.561  Right Knee Pain  M25.661 Stiffness of right knee, not elsewhere classified  R53.1 Weakness  R26.2 Difficulty in walking   Date of Evaluation 25   Additional Pertinent History Barbara Olivares has no past medical history on file.  she has a past surgical history that includes  section (); Ankle fracture surgery (Right, ); Ovarian cyst surgery; and Cholecystectomy, laparoscopic (N/A, 2024).   Will need left knee done eventually     Allergies No Known Allergies   Medications   Current Outpatient Medications:     Multiple Vitamin (MULTIVITAMIN ADULT PO), Take by mouth, Disp: , Rfl:       Functional Outcome Measure Used Koos    Functional Outcome Score 10 (25)       Insurance: Primary: Payor: [employer] [place]ICA[place] /  /  / ,   Secondary: PATIENT NAME: Barbara Olivares  MRN NUMBER: [id number]         CSN:   PRIMARY INSURANCE COMPANY: [employer] ([employer])  INSURANCE COMPANY REPRESENTATIVE: Trudy  INSURANCE COMPANY TELEPHONE NUMBER: [phone]  INSURANCE COMPANY FAX NUMBER:    OUTPATIENT BENEFITS:              DEDUCTIBLE:  N/A

## 2025-07-16 ENCOUNTER — HOSPITAL ENCOUNTER (OUTPATIENT)
Dept: PHYSICAL THERAPY | Age: 55
Setting detail: THERAPIES SERIES
Discharge: HOME | End: 2025-07-16
Payer: MEDICAID

## 2025-07-16 PROCEDURE — 97110 THERAPEUTIC EXERCISES: CPT

## 2025-07-16 PROCEDURE — 97140 MANUAL THERAPY 1/> REGIONS: CPT

## 2025-07-16 NOTE — PROGRESS NOTES
[facility]  PHYSICAL THERAPY  [] EVALUATION  [x] DAILY NOTE (LAND) [] DAILY NOTE (AQUATIC ) [] PROGRESS NOTE [] DISCHARGE NOTE    [] OUTPATIENT REHABILITATION CENTER [facility]   [] [place] [facility]    [] [facility]   [x] LATONYA [facility]    Date: 2025  Patient Name:  Barbara Olivares  : 1970  MRN: [id number]  CSN: [id number]    Referring Practitioner Mook Chambers MD [id number]      Diagnosis  Diagnoses       M17.11 (ICD-10-CM) - Unilateral primary osteoarthritis, right knee    Z01.810 (ICD-10-CM) - Encounter for preprocedural cardiovascular examination    Z01.811 (ICD-10-CM) - Encounter for preprocedural respiratory examination    Z01.812 (ICD-10-CM) - Encounter for preprocedural laboratory examination           Treatment Diagnosis Z47.1 Aftercare following joint replacement surgery  M25.561  Right Knee Pain  M25.661 Stiffness of right knee, not elsewhere classified  R53.1 Weakness  R26.2 Difficulty in walking   Date of Evaluation 25   Additional Pertinent History Barbara Olivares has no past medical history on file.  she has a past surgical history that includes  section (); Ankle fracture surgery (Right, ); Ovarian cyst surgery; and Cholecystectomy, laparoscopic (N/A, 2024).   Will need left knee done eventually     Allergies No Known Allergies   Medications   Current Outpatient Medications:     Multiple Vitamin (MULTIVITAMIN ADULT PO), Take by mouth, Disp: , Rfl:       Functional Outcome Measure Used Koos    Functional Outcome Score 10 (25)       Insurance: Primary: Payor: [employer] [place] /  /  / ,   Secondary: PATIENT NAME: Barbara Olivares  MRN NUMBER: [id number]         CSN:   PRIMARY INSURANCE COMPANY: [employer] ([employer])  INSURANCE COMPANY REPRESENTATIVE: Trudy  INSURANCE COMPANY TELEPHONE NUMBER: [phone]  INSURANCE COMPANY FAX NUMBER:    OUTPATIENT BENEFITS:              DEDUCTIBLE:  N/A

## 2025-07-22 ENCOUNTER — HOSPITAL ENCOUNTER (OUTPATIENT)
Dept: PHYSICAL THERAPY | Age: 55
Setting detail: THERAPIES SERIES
Discharge: HOME | End: 2025-07-22
Payer: MEDICAID

## 2025-07-22 PROCEDURE — 97530 THERAPEUTIC ACTIVITIES: CPT

## 2025-07-22 NOTE — PROGRESS NOTES
[facility]  PHYSICAL THERAPY  [] EVALUATION  [x] DAILY NOTE (LAND) [] DAILY NOTE (AQUATIC ) [] PROGRESS NOTE [] DISCHARGE NOTE    [] OUTPATIENT REHABILITATION CENTER [facility]   [] [place] [facility]    [] [facility]   [x] [facility]    Date: 2025  Patient Name:  Barbara Olivares  : 1970  MRN: [id number]  CSN: [id number]    Referring Practitioner Mook Chambers MD [id number]      Diagnosis  Diagnoses       M17.11 (ICD-10-CM) - Unilateral primary osteoarthritis, right knee    Z01.810 (ICD-10-CM) - Encounter for preprocedural cardiovascular examination    Z01.811 (ICD-10-CM) - Encounter for preprocedural respiratory examination    Z01.812 (ICD-10-CM) - Encounter for preprocedural laboratory examination           Treatment Diagnosis Z47.1 Aftercare following joint replacement surgery  M25.561  Right Knee Pain  M25.661 Stiffness of right knee, not elsewhere classified  R53.1 Weakness  R26.2 Difficulty in walking   Date of Evaluation 25   Additional Pertinent History Barbara Olivares has no past medical history on file.  she has a past surgical history that includes  section (); Ankle fracture surgery (Right, ); Ovarian cyst surgery; and Cholecystectomy, laparoscopic (N/A, 2024).   Will need left knee done eventually     Allergies No Known Allergies   Medications   Current Outpatient Medications:     Multiple Vitamin (MULTIVITAMIN ADULT PO), Take by mouth, Disp: , Rfl:       Functional Outcome Measure Used Koos    Functional Outcome Score 10 (25)       Insurance: Primary: Payor: [employer] [place] /  /  / ,   Secondary: PATIENT NAME: Barbara Olivares  MRN NUMBER: [id number]         CSN:   PRIMARY INSURANCE COMPANY: [employer] ([employer])  INSURANCE COMPANY REPRESENTATIVE: Trudy  INSURANCE COMPANY TELEPHONE NUMBER: [phone]  INSURANCE COMPANY FAX NUMBER:    OUTPATIENT BENEFITS:              DEDUCTIBLE:  N/A

## 2025-07-24 ENCOUNTER — HOSPITAL ENCOUNTER (OUTPATIENT)
Dept: PHYSICAL THERAPY | Age: 55
Setting detail: THERAPIES SERIES
Discharge: HOME | End: 2025-07-24
Payer: MEDICAID

## 2025-07-24 PROCEDURE — 97140 MANUAL THERAPY 1/> REGIONS: CPT

## 2025-07-24 PROCEDURE — 97110 THERAPEUTIC EXERCISES: CPT

## 2025-07-24 NOTE — PROGRESS NOTES
[facility]  PHYSICAL THERAPY  [] EVALUATION  [x] DAILY NOTE (LAND) [] DAILY NOTE (AQUATIC ) [] PROGRESS NOTE [] DISCHARGE NOTE    [] OUTPATIENT REHABILITATION CENTER [facility]   [] [place] [facility]    [] [facility]   [x] LAT[facility]    Date: 2025  Patient Name:  Barbara Olivares  : 1970  MRN: [id number]  CSN: [id number]    Referring Practitioner Mook Chambers MD [id number]      Diagnosis  Diagnoses       M17.11 (ICD-10-CM) - Unilateral primary osteoarthritis, right knee    Z01.810 (ICD-10-CM) - Encounter for preprocedural cardiovascular examination    Z01.811 (ICD-10-CM) - Encounter for preprocedural respiratory examination    Z01.812 (ICD-10-CM) - Encounter for preprocedural laboratory examination           Treatment Diagnosis Z47.1 Aftercare following joint replacement surgery  M25.561  Right Knee Pain  M25.661 Stiffness of right knee, not elsewhere classified  R53.1 Weakness  R26.2 Difficulty in walking   Date of Evaluation 25   Additional Pertinent History Barbara Olivares has no past medical history on file.  she has a past surgical history that includes  section (); Ankle fracture surgery (Right, ); Ovarian cyst surgery; and Cholecystectomy, laparoscopic (N/A, 2024).   Will need left knee done eventually     Allergies No Known Allergies   Medications   Current Outpatient Medications:     Multiple Vitamin (MULTIVITAMIN ADULT PO), Take by mouth, Disp: , Rfl:       Functional Outcome Measure Used Koos    Functional Outcome Score 10 (25)       Insurance: Primary: Payor: [employer] /  /  / ,   Secondary: PATIENT NAME: Barbara Olivares  MRN NUMBER: [id number]         CSN:   PRIMARY INSURANCE COMPANY: [employer] ([employer])  INSURANCE COMPANY REPRESENTATIVE: Trudy  INSURANCE COMPANY TELEPHONE NUMBER: [phone]  INSURANCE COMPANY FAX NUMBER:    OUTPATIENT BENEFITS:              DEDUCTIBLE:  N/A

## 2025-07-29 ENCOUNTER — HOSPITAL ENCOUNTER (OUTPATIENT)
Dept: PHYSICAL THERAPY | Age: 55
Setting detail: THERAPIES SERIES
Discharge: HOME | End: 2025-07-29
Payer: MEDICAID

## 2025-07-29 PROCEDURE — 97530 THERAPEUTIC ACTIVITIES: CPT

## 2025-07-29 PROCEDURE — 97110 THERAPEUTIC EXERCISES: CPT

## 2025-07-29 NOTE — PROGRESS NOTES
[facility]  PHYSICAL THERAPY  [] EVALUATION  [x] DAILY NOTE (LAND) [] DAILY NOTE (AQUATIC ) [] PROGRESS NOTE [] DISCHARGE NOTE    [] OUTPATIENT [facility]ABIL[facility]ATION [facility]   [] [facility] [facility]    [] [facility]   [x] [facility]    Date: 2025  Patient Name:  Barbara Olivares  : 1970  MRN: [id number]  CSN: [id number]    Referring Practitioner Mook Chambers MD [id number]      Diagnosis  Diagnoses       M17.11 (ICD-10-CM) - Unilateral primary osteoarthritis, right knee    Z01.810 (ICD-10-CM) - Encounter for preprocedural cardiovascular examination    Z01.811 (ICD-10-CM) - Encounter for preprocedural respiratory examination    Z01.812 (ICD-10-CM) - Encounter for preprocedural laboratory examination           Treatment Diagnosis Z47.1 Aftercare following joint replacement surgery  M25.561  Right Knee Pain  M25.661 Stiffness of right knee, not elsewhere classified  R53.1 Weakness  R26.2 Difficulty in walking   Date of Evaluation 25   Additional Pertinent History Barbara Olivares has no past medical history on file.  she has a past surgical history that includes  section (); Ankle fracture surgery (Right, ); Ovarian cyst surgery; and Cholecystectomy, laparoscopic (N/A, 2024).   Will need left knee done eventually     Allergies No Known Allergies   Medications   Current Outpatient Medications:     Multiple Vitamin (MULTIVITAMIN ADULT PO), Take by mouth, Disp: , Rfl:       Functional Outcome Measure Used [place]    Functional Outcome Score 10 (25)       Insurance: Primary: Payor: [employer] [place] /  /  / ,   Secondary: PATIENT NAME: Barbara Olivares  MRN NUMBER: [id number]         CSN:   PRIMARY INSURANCE COMPANY: [employer] ([employer])  INSURANCE COMPANY REPRESENTATIVE: Trudy  INSURANCE COMPANY TELEPHONE NUMBER: [phone]  INSURANCE COMPANY FAX NUMBER:    OUTPATIENT BENEFITS:              DEDUCTIBLE:  N/A

## 2025-07-31 ENCOUNTER — APPOINTMENT (OUTPATIENT)
Dept: PHYSICAL THERAPY | Age: 55
End: 2025-07-31
Payer: MEDICAID

## 2025-08-04 ENCOUNTER — HOSPITAL ENCOUNTER (OUTPATIENT)
Dept: PHYSICAL THERAPY | Age: 55
Setting detail: THERAPIES SERIES
Discharge: HOME | End: 2025-08-04
Payer: MEDICAID

## 2025-08-04 PROCEDURE — 97110 THERAPEUTIC EXERCISES: CPT

## 2025-08-04 PROCEDURE — 97140 MANUAL THERAPY 1/> REGIONS: CPT

## 2025-08-07 ENCOUNTER — HOSPITAL ENCOUNTER (OUTPATIENT)
Dept: PHYSICAL THERAPY | Age: 55
Setting detail: THERAPIES SERIES
Discharge: HOME | End: 2025-08-07
Payer: MEDICAID

## 2025-08-07 PROCEDURE — 97110 THERAPEUTIC EXERCISES: CPT

## 2025-08-11 ENCOUNTER — HOSPITAL ENCOUNTER (OUTPATIENT)
Dept: PHYSICAL THERAPY | Age: 55
Setting detail: THERAPIES SERIES
Discharge: HOME | End: 2025-08-11
Payer: MEDICAID

## 2025-08-11 PROCEDURE — 97110 THERAPEUTIC EXERCISES: CPT

## (undated) DEVICE — PUMP SUC IRR TBNG L10FT W/ HNDPC ASSEMB STRYKEFLOW 2

## (undated) DEVICE — GLOVE ORANGE PI 7   MSG9070

## (undated) DEVICE — COLUMN DRAPE

## (undated) DEVICE — ELECTRO LUBE IS A SINGLE PATIENT USE DEVICE THAT IS INTENDED TO BE USED ON ELECTROSURGICAL ELECTRODES TO REDUCE STICKING.: Brand: KEY SURGICAL ELECTRO LUBE

## (undated) DEVICE — INSUFFLATION NEEDLE TO ESTABLISH PNEUMOPERITONEUM.: Brand: INSUFFLATION NEEDLE

## (undated) DEVICE — TUBING INSUFFLATOR HEAT HUMIDIFIED SMK EVAC SET PNEUMOCLEAR

## (undated) DEVICE — BLADELESS OBTURATOR: Brand: WECK VISTA

## (undated) DEVICE — GOWN,SIRUS,NON REINFRCD,LARGE,SET IN SL: Brand: MEDLINE

## (undated) DEVICE — SUTURE VICRYL + SZ 0 L27IN ABSRB VLT L26MM UR-6 5/8 CIR VCP603H

## (undated) DEVICE — BAG SPEC REM 224ML W4XL6IN DIA10MM 1 HND GYN DISP ENDOPCH

## (undated) DEVICE — GENERAL LAPAROSCOPY-LF: Brand: MEDLINE INDUSTRIES, INC.

## (undated) DEVICE — GLOVE ORANGE PI 8   MSG9080

## (undated) DEVICE — TROCAR: Brand: KII FIOS FIRST ENTRY

## (undated) DEVICE — SEAL

## (undated) DEVICE — ARM DRAPE

## (undated) DEVICE — REDUCER: Brand: ENDOWRIST